# Patient Record
Sex: FEMALE | Race: WHITE | NOT HISPANIC OR LATINO
[De-identification: names, ages, dates, MRNs, and addresses within clinical notes are randomized per-mention and may not be internally consistent; named-entity substitution may affect disease eponyms.]

---

## 2017-01-03 ENCOUNTER — APPOINTMENT (OUTPATIENT)
Dept: HEART AND VASCULAR | Facility: CLINIC | Age: 72
End: 2017-01-03

## 2017-01-05 ENCOUNTER — APPOINTMENT (OUTPATIENT)
Dept: HEART AND VASCULAR | Facility: CLINIC | Age: 72
End: 2017-01-05

## 2017-01-05 VITALS
BODY MASS INDEX: 28.16 KG/M2 | WEIGHT: 167 LBS | DIASTOLIC BLOOD PRESSURE: 72 MMHG | SYSTOLIC BLOOD PRESSURE: 132 MMHG | HEART RATE: 63 BPM | HEIGHT: 64.5 IN

## 2017-01-24 ENCOUNTER — OUTPATIENT (OUTPATIENT)
Dept: OUTPATIENT SERVICES | Facility: HOSPITAL | Age: 72
LOS: 1 days | Discharge: HOME | End: 2017-01-24
Payer: COMMERCIAL

## 2017-01-24 VITALS
SYSTOLIC BLOOD PRESSURE: 132 MMHG | DIASTOLIC BLOOD PRESSURE: 72 MMHG | RESPIRATION RATE: 17 BRPM | OXYGEN SATURATION: 99 % | HEART RATE: 70 BPM

## 2017-01-24 DIAGNOSIS — Z90.49 ACQUIRED ABSENCE OF OTHER SPECIFIED PARTS OF DIGESTIVE TRACT: Chronic | ICD-10-CM

## 2017-01-24 DIAGNOSIS — R00.2 PALPITATIONS: ICD-10-CM

## 2017-01-24 DIAGNOSIS — I47.1 SUPRAVENTRICULAR TACHYCARDIA: ICD-10-CM

## 2017-01-24 DIAGNOSIS — Z90.89 ACQUIRED ABSENCE OF OTHER ORGANS: Chronic | ICD-10-CM

## 2017-01-24 DIAGNOSIS — R00.0 TACHYCARDIA, UNSPECIFIED: ICD-10-CM

## 2017-01-24 PROCEDURE — 99234 HOSP IP/OBS SM DT SF/LOW 45: CPT

## 2017-01-24 PROCEDURE — 93623 PRGRMD STIMJ&PACG IV RX NFS: CPT

## 2017-01-24 PROCEDURE — C1733: CPT

## 2017-01-24 PROCEDURE — C1893: CPT

## 2017-01-24 PROCEDURE — 93653 COMPRE EP EVAL TX SVT: CPT

## 2017-01-24 PROCEDURE — C1730: CPT

## 2017-01-24 PROCEDURE — C1894: CPT

## 2017-01-24 PROCEDURE — 93623 PRGRMD STIMJ&PACG IV RX NFS: CPT | Mod: 26

## 2017-01-24 PROCEDURE — 93621 COMP EP EVL L PAC&REC C SINS: CPT | Mod: 26

## 2017-01-24 PROCEDURE — 93613 INTRACARDIAC EPHYS 3D MAPG: CPT

## 2017-01-24 RX ORDER — ISOPROTERENOL HYDROCHLORIDE 1 MG/5ML
1 INJECTION, SOLUTION INTRACARDIAC; INTRAMUSCULAR; INTRAVENOUS; SUBCUTANEOUS
Qty: 1 | Refills: 0 | Status: DISCONTINUED | OUTPATIENT
Start: 2017-01-24 | End: 2017-01-24

## 2017-01-24 RX ORDER — LEVOTHYROXINE SODIUM 125 MCG
75 TABLET ORAL ONCE
Qty: 0 | Refills: 0 | Status: DISCONTINUED | OUTPATIENT
Start: 2017-01-25 | End: 2017-01-24

## 2017-01-24 RX ORDER — LEVOTHYROXINE SODIUM 125 MCG
1 TABLET ORAL
Qty: 0 | Refills: 0 | COMMUNITY

## 2017-01-24 NOTE — H&P ADULT. - HISTORY OF PRESENT ILLNESS
[id number] MsmdFxbaLvmpi3Fahwm Ms Ellis is a pleasant 71 year old female with hypothyroidism and palpitations, who now presents for follow up.    Mrs. Ellis states that about 2 months ago she started to experience palpitations. She had a cardiac catheterization done with clean coronaries. 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 She was placed on metolrolol and plavix. She notes episodes of palpitations in the past but states they were not as severe. She wore an event monitor recently and presents to review the results. She experienced palpitations for about 2 hours on 12/27. She has mild chest discomfort with the palpitations. No syncope, QUIJANO, near syncope, orthopnea.      .   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

## 2017-01-24 NOTE — H&P ADULT. - ASSESSMENT
[id number] PezdNvjyDndsv2Slnjj Ms Ellis is a pleasant 71 year old female with hypothyroidism and palpitations, who now presents for follow up.

## 2017-11-13 NOTE — H&P ADULT. - PULMONARY EMBOLUS
Called mom to confirm appt time and location for tomorrow.      Mom mentioned she saw Dr. Martinez last year, but since Dr. Martinez will be out of the office she would like to see Dr. Taylor and stick with her if that's okay, due to pt's symptoms and needing appt.  Informed mom I will notify MDs.   
Called mom, offered to schedule appt with NP and then pt could continue to f/u with Dr. Martinez.  Mom states she will keep 1:30 appt with Dr. Taylor and f/u with her.   
no

## 2018-11-11 ENCOUNTER — TRANSCRIPTION ENCOUNTER (OUTPATIENT)
Age: 73
End: 2018-11-11

## 2020-02-15 ENCOUNTER — TRANSCRIPTION ENCOUNTER (OUTPATIENT)
Age: 75
End: 2020-02-15